# Patient Record
Sex: MALE | Race: WHITE | NOT HISPANIC OR LATINO | Employment: FULL TIME | URBAN - METROPOLITAN AREA
[De-identification: names, ages, dates, MRNs, and addresses within clinical notes are randomized per-mention and may not be internally consistent; named-entity substitution may affect disease eponyms.]

---

## 2021-07-07 ENCOUNTER — RECORDS - HEALTHEAST (OUTPATIENT)
Dept: LAB | Facility: CLINIC | Age: 21
End: 2021-07-07

## 2021-07-07 LAB
GLUCOSE-6-PHOSPHATE DEHYDROGENASE QUAL - HISTORICAL: NORMAL
SICKLE CELL PREP: NEGATIVE

## 2021-07-08 LAB
ABO/RH(D): NORMAL
ABORH REPEAT: NORMAL

## 2022-01-04 ENCOUNTER — OFFICE VISIT (OUTPATIENT)
Dept: INTERNAL MEDICINE | Facility: CLINIC | Age: 22
End: 2022-01-04
Payer: COMMERCIAL

## 2022-01-04 ENCOUNTER — LAB (OUTPATIENT)
Dept: LAB | Facility: CLINIC | Age: 22
End: 2022-01-04
Payer: COMMERCIAL

## 2022-01-04 VITALS
OXYGEN SATURATION: 100 % | HEART RATE: 71 BPM | SYSTOLIC BLOOD PRESSURE: 123 MMHG | HEIGHT: 74 IN | DIASTOLIC BLOOD PRESSURE: 82 MMHG

## 2022-01-04 DIAGNOSIS — Z00.00 HEALTHCARE MAINTENANCE: ICD-10-CM

## 2022-01-04 DIAGNOSIS — Z00.00 HEALTHCARE MAINTENANCE: Primary | ICD-10-CM

## 2022-01-04 DIAGNOSIS — F90.2 ATTENTION DEFICIT HYPERACTIVITY DISORDER (ADHD), COMBINED TYPE: ICD-10-CM

## 2022-01-04 LAB
CHOLEST SERPL-MCNC: 147 MG/DL
FASTING STATUS PATIENT QL REPORTED: YES
HBA1C MFR BLD: 5.2 % (ref 0–5.6)
HDLC SERPL-MCNC: 59 MG/DL
LDLC SERPL CALC-MCNC: 77 MG/DL
NONHDLC SERPL-MCNC: 88 MG/DL
TRIGL SERPL-MCNC: 54 MG/DL

## 2022-01-04 PROCEDURE — 80061 LIPID PANEL: CPT | Performed by: PATHOLOGY

## 2022-01-04 PROCEDURE — 99204 OFFICE O/P NEW MOD 45 MIN: CPT | Mod: GC

## 2022-01-04 PROCEDURE — 36415 COLL VENOUS BLD VENIPUNCTURE: CPT | Performed by: PATHOLOGY

## 2022-01-04 PROCEDURE — 83036 HEMOGLOBIN GLYCOSYLATED A1C: CPT | Performed by: PATHOLOGY

## 2022-01-04 RX ORDER — DEXTROAMPHETAMINE SACCHARATE, AMPHETAMINE ASPARTATE MONOHYDRATE, DEXTROAMPHETAMINE SULFATE AND AMPHETAMINE SULFATE 2.5; 2.5; 2.5; 2.5 MG/1; MG/1; MG/1; MG/1
10 CAPSULE, EXTENDED RELEASE ORAL DAILY
Qty: 30 CAPSULE | Refills: 0 | Status: SHIPPED | OUTPATIENT
Start: 2022-01-04 | End: 2022-02-10

## 2022-01-04 ASSESSMENT — PAIN SCALES - GENERAL: PAINLEVEL: NO PAIN (0)

## 2022-01-04 NOTE — PROGRESS NOTES
Internal Medicine Primary Care   SUBJECTIVE  CC: Establishing care    HPI: Luiz Almeida is a 21 year old male without a significant past medical history presenting to [facility].       PAST MEDICAL HISTORY  There is no problem list on file for this patient.        MEDICATIONS  No current outpatient medications on file.         PAST SURGICAL HISTORY  No past surgical history on file.      SOCIAL HISTORY    Tobacco, status; current consumption: ***    Alcohol: ***    Drugs: ***    Diet: ***    Exercise: ***    Occupation: ***    FAMILY HISTORY  No family history on file.      ALLERGIES      OBJECTIVE    Vitals      Last 6 BPs  BP Readings from Last 6 Encounters:   No data found for BP       Physical Exam    ASSESSMENT AND PLAN    RHM/Preventative care  - COVID 19 vaccination: ***  - Routine labs: ***  - Colonoscopy/FIT: ***   - Mammogram (F): ***  - Papsmears: ***  - AAA screening: ***  - Pneumovax: ***  - Shingrix: ***  - Intimate partner abuse: ***    ***      ***      ***    Patient understands and agrees with the above assessment and plan. Patient was staffed and seen with  ***    Follow up  RTC in *** for ***      Emiliano Molina,   PGY 1, Internal Medicine

## 2022-01-04 NOTE — PROGRESS NOTES
"I, Goran Trujillo MD saw the patient with the resident, and agree with the resident's findings and plan of care as documented in the resident's note.  /82 (BP Location: Right arm, Patient Position: Sitting, Cuff Size: Adult Regular)   Pulse 71   Ht 1.88 m (6' 2\")   SpO2 100%   I personally reviewed vital signs and past record.  Key findings: concerned about focusing when reading, working. Mind wandering, short-term memory issues, fidgets. Agree with plan for med, options.    "

## 2022-01-04 NOTE — NURSING NOTE
Chief Complaint   Patient presents with     Establish Care     Consult       SEYMOUR Soto at 12:07 PM on 1/4/2022.

## 2022-01-04 NOTE — PROGRESS NOTES
"                  Internal Medicine Primary Care   SUBJECTIVE  CC: Establishing care    HPI: Luiz Almeida is a 21 year old male without a significant past medical history here to establish care. Patient is a chemistry phD candidate. Today he wants to discuss symptoms regarding inattentiveness. Over the past few years he has noticed his mind has been wandering a lot more and that he is unable to pay attention to tasks for extended period of time. He gets sidetracked easily. He also endorses trouble with organizing tasks, short term memory deficits, has trouble finishing projects, endorses fidgeting behaviors, has to have music or TV playing to focus on things, has trouble with lengthy reading. He denies loss of appetite, sleep disturbances, depression, suicidality, anxiety, nervousness.      PAST MEDICAL HISTORY  There is no problem list on file for this patient.        MEDICATIONS  Current Outpatient Medications   Medication Sig Dispense Refill     amphetamine-dextroamphetamine (ADDERALL XR) 10 MG 24 hr capsule Take 1 capsule (10 mg) by mouth daily 30 capsule 0         PAST SURGICAL HISTORY  No past surgical history on file.      SOCIAL HISTORY    Tobacco, status; current consumption: Never smoker    Alcohol: 2-3 beers per week, usually with dinner, no concern for abuse    Drugs: Denies current or former use    Occupation: Chemistry phD candidate.    FAMILY HISTORY  No family history on file.      ALLERGIES   No Known Allergies      OBJECTIVE    Vitals  /82 (BP Location: Right arm, Patient Position: Sitting, Cuff Size: Adult Regular)   Pulse 71   Ht 1.88 m (6' 2\")   SpO2 100%     Last 6 BPs  BP Readings from Last 6 Encounters:   01/04/22 123/82       Physical Exam  Constitutional:       Appearance: Normal appearance. He is normal weight.   HENT:      Head: Normocephalic and atraumatic.   Eyes:      Extraocular Movements: Extraocular movements intact.   Cardiovascular:      Rate and Rhythm: Normal rate and " regular rhythm.      Pulses: Normal pulses.      Heart sounds: Normal heart sounds.   Pulmonary:      Effort: Pulmonary effort is normal.      Breath sounds: Normal breath sounds.   Abdominal:      General: Abdomen is flat. Bowel sounds are normal.      Palpations: Abdomen is soft.   Neurological:      Mental Status: He is alert.   Psychiatric:         Mood and Affect: Mood normal.         Behavior: Behavior normal.         Thought Content: Thought content normal.         Judgment: Judgment normal.         ASSESSMENT AND PLAN    RHM/Preventative care  - COVID 19 vaccination: Vaccinated, including booster  - Routine labs: Lipid and HbA1c  - Influenza: Vaccinated     ADHD, combined inattention and impulsivity type  Patient fulfills DSM-V criteria for ADHD diagnosis. Has > 6 symptoms related to impulsivity and inattention.  - Start 10 mg XR adderall, reassess in 1 month  - denies cardiac symptoms (CP, SOB, palpitations)      Patient understands and agrees with the above assessment and plan. Patient was staffed and seen with Dr. Trujillo    Follow up  RTC in 1 month for follow up on ADHD management      Emiliano Molina DO  PGY 1, Internal Medicine

## 2022-02-06 ENCOUNTER — HEALTH MAINTENANCE LETTER (OUTPATIENT)
Age: 22
End: 2022-02-06

## 2022-02-08 ENCOUNTER — OFFICE VISIT (OUTPATIENT)
Dept: INTERNAL MEDICINE | Facility: CLINIC | Age: 22
End: 2022-02-08
Payer: COMMERCIAL

## 2022-02-08 VITALS
OXYGEN SATURATION: 95 % | BODY MASS INDEX: 22.33 KG/M2 | HEIGHT: 74 IN | SYSTOLIC BLOOD PRESSURE: 103 MMHG | HEART RATE: 73 BPM | DIASTOLIC BLOOD PRESSURE: 69 MMHG | WEIGHT: 174 LBS

## 2022-02-08 DIAGNOSIS — F90.2 ATTENTION DEFICIT HYPERACTIVITY DISORDER (ADHD), COMBINED TYPE: ICD-10-CM

## 2022-02-08 PROCEDURE — 99214 OFFICE O/P EST MOD 30 MIN: CPT | Mod: GC

## 2022-02-08 RX ORDER — DEXTROAMPHETAMINE SACCHARATE, AMPHETAMINE ASPARTATE MONOHYDRATE, DEXTROAMPHETAMINE SULFATE AND AMPHETAMINE SULFATE 2.5; 2.5; 2.5; 2.5 MG/1; MG/1; MG/1; MG/1
10 CAPSULE, EXTENDED RELEASE ORAL DAILY
Qty: 30 CAPSULE | Refills: 11 | Status: CANCELLED | OUTPATIENT
Start: 2022-02-08

## 2022-02-08 ASSESSMENT — PAIN SCALES - GENERAL: PAINLEVEL: NO PAIN (0)

## 2022-02-08 ASSESSMENT — MIFFLIN-ST. JEOR: SCORE: 1854.01

## 2022-02-08 NOTE — PROGRESS NOTES
"                  Internal Medicine Primary Care   SUBJECTIVE  CC: Follow up; med management    HPI: Luiz Almeida is a 21 year old male without a significant past medical history here for follow up on ADHD and adderall prescription. He states that adderall has helped him a lot with attention and concentration in his studies and research. He is not noticing any side effects. He denies CP, SOB, palpitations, loss of appetite or changes in sleep patterns. He is agreeable to do a urine drug screen today and ok to sign a drug contract      PAST MEDICAL HISTORY  There is no problem list on file for this patient.        MEDICATIONS  Current Outpatient Medications   Medication Sig Dispense Refill     amphetamine-dextroamphetamine (ADDERALL XR) 10 MG 24 hr capsule Take 1 capsule (10 mg) by mouth daily 30 capsule 0         PAST SURGICAL HISTORY  No past surgical history on file.      SOCIAL HISTORY    Tobacco, status; current consumption: Never smoker    Alcohol: 2-3 beers per week, usually with dinner, no concern for abuse    Drugs: Denies current or former use    Occupation: Chemistry phD candidate.    FAMILY HISTORY  No family history on file.      ALLERGIES   No Known Allergies      OBJECTIVE    Vitals  /69 (BP Location: Right arm, Patient Position: Sitting, Cuff Size: Adult Regular)   Pulse 73   Ht 1.88 m (6' 2\")   Wt 78.9 kg (174 lb)   SpO2 95%   BMI 22.34 kg/m      Last 6 BPs  BP Readings from Last 6 Encounters:   02/08/22 103/69   01/04/22 123/82       Physical Exam  Constitutional:       Appearance: Normal appearance. He is normal weight.   HENT:      Head: Normocephalic and atraumatic.   Eyes:      Extraocular Movements: Extraocular movements intact.   Cardiovascular:      Rate and Rhythm: Normal rate and regular rhythm.      Pulses: Normal pulses.      Heart sounds: Normal heart sounds.   Pulmonary:      Effort: Pulmonary effort is normal.      Breath sounds: Normal breath sounds.   Abdominal:      " General: Abdomen is flat. Bowel sounds are normal.      Palpations: Abdomen is soft.   Neurological:      Mental Status: He is alert.   Psychiatric:         Mood and Affect: Mood normal.         Behavior: Behavior normal.         Thought Content: Thought content normal.         Judgment: Judgment normal.         ASSESSMENT AND PLAN    RHM/Preventative care  - COVID 19 vaccination: Vaccinated, including booster  - Routine labs: Lipid and HbA1c at last visit (normal)  - Influenza: Vaccinated     ADHD, combined inattention and impulsivity type  Patient fulfills DSM-V criteria for ADHD diagnosis. Has > 6 symptoms related to impulsivity and inattention.  - Doing well on 10 mg XR aderrall  - UDS today, patient also signed drug contract  - denies adverse effects (CP, SOB, palpitations, loss of appetite, changes in sleep pattern)  - BP, HR and weight at baseline      Patient understands and agrees with the above assessment and plan. Patient was staffed and seen with Dr. Alston    Follow up  RTC in 1 year for routine physical      Emiliano Molina DO  Internal Medicine, PGY 1  [facility]

## 2022-02-08 NOTE — NURSING NOTE
Chief Complaint   Patient presents with     Recheck Medication     5 week follow up       Miguelina Chamberlain EMT at 1:00 PM on 2/8/2022

## 2022-02-08 NOTE — LETTER
[facility]  02/08/22  Patient: Luiz Almeida  YOB: 2000  Medical Record Number: [id number]                                                                                  Non-Opioid Controlled Substance Agreement    This is an agreement between you and your provider regarding safe and appropriate use of controlled substances prescribed by your care team. Controlled substances are?medicines that can cause physical and mental dependence (abuse).     There are strict laws about having and using these medicines. We here at [facility] are  committed to working with you in your efforts to get better. To support you in this work, we'll help you schedule regular office appointments for medicine refills. If we must cancel or change your appointment for any reason, we'll make sure you have enough medicine to last until your next appointment.     As a Provider, I will:     Listen carefully to your concerns while treating you with respect.     Recommend a treatment plan that I believe is in your best interest and may involve therapies other than medicine.      Talk with you often about the possible benefits and the risk of harm of any medicine that we prescribe for you.    Assess the safety of this medicine and check how well it works.      Provide a plan on how to taper (discontinue or go off) using this medicine if the decision is made to stop its use.      ::  As a Patient, I understand controlled substances:       Are prescribed by my care provider to help me function or work and manage my condition(s).?    Are strong medicines and can cause serious side effects.       Need to be taken exactly as prescribed.?Combining controlled substances with certain medicines or chemicals (such as illegal drugs, alcohol, sedatives, sleeping pills, and benzodiazepines) can be dangerous or even fatal.? If I stop taking my medicines suddenly, I may have severe withdrawal  symptoms.     The risks, benefits, and side effects of these medicine(s) were explained to me. I agree that:    1. I will take part in other treatments as advised by my care team. This may be psychiatry or counseling, physical therapy, behavioral therapy, group treatment or a referral to specialist.    2. I will keep all my appointments and understand this is part of the monitoring of controlled substances.?My care team may require an office visit for EVERY controlled substance refill. If I miss appointments or don t follow instructions, my care team may stop my medicine    3. I will take my medicines as prescribed. I will not change the dose or schedule unless my care team tells me to. There will be no refills if I run out early.      4. I may be asked to come to the clinic and complete a urine drug test or complete a pill count. If I don t give a urine sample or participate in a pill count, the care team may stop my medicine.    5. I will only receive controlled substance prescriptions from this clinic. If I am treated by another provider, I will tell them that I am taking controlled substances and that I have a treatment agreement with this provider. I will inform my [facility] care team within one business day if I am given a prescription for any controlled substance by another healthcare provider. My [facility] care team can contact other providers and pharmacists about my use of any medicines.    6. It is up to me to make sure that I don't run out of my medicines on weekends or holidays.?If my care team is willing to refill my prescription without a visit, I must request refills only during office hours. Refills may take up to 5-7 business days to process. I will use one pharmacy to fill all my controlled substance prescriptions. I will notify the clinic about any changes to my insurance or medicine availability.    7. I am responsible for my prescriptions. If the medicine/prescription is lost,  stolen or destroyed, it will not be replaced.?I also agree not to share controlled substance medicines with anyone.     8. I am aware I should not use any illegal or recreational drugs. I agree not to drink alcohol unless my care team says I can.     9. If I enroll in the [place] Medical Cannabis program, I will tell my care team before my next refill.    10. I will tell my care team right away if I become pregnant, have a new medical problem treated outside of my regular clinic, or have a change in my medicines.     11. I understand that this medicine can affect my thinking, judgment and reaction time.? Alcohol and drugs affect the brain and body, which can affect the safety of my driving. Being under the influence of alcohol or drugs can affect my decision-making, behaviors, personal safety and the safety of others. Driving while impaired (DWI) can occur if a person is driving, operating or in physical control of a car, motorcycle, boat, snowmobile, ATV, motorbike, off-road vehicle or any other motor vehicle ([place] Statute 169A.20). I understand the risk if I choose to drive or operate any vehicle or machinery.    I understand that if I do not follow any of the conditions above, my prescriptions or treatment may be stopped or changed.   I agree that my provider, clinic care team and pharmacy may work with any city, state or federal law enforcement agency that investigates the misuse, sale or other diversion of my controlled medicine. I will allow my provider to discuss my care with, or share a copy of, this agreement with any other treating provider, pharmacy or emergency room where I receive care.     I have read this agreement and have asked questions about anything I did not understand.    ________________________________________________________  Patient Signature - Luiz Almeida     ___________________                   Date     ________________________________________________________  Provider Signature -  Emiliano Molina, MD       ___________________                   Date     ________________________________________________________  Witness Signature (required if provider not present while patient signing)          ___________________                   Date

## 2022-02-10 DIAGNOSIS — F90.2 ATTENTION DEFICIT HYPERACTIVITY DISORDER (ADHD), COMBINED TYPE: ICD-10-CM

## 2022-02-10 RX ORDER — DEXTROAMPHETAMINE SACCHARATE, AMPHETAMINE ASPARTATE MONOHYDRATE, DEXTROAMPHETAMINE SULFATE AND AMPHETAMINE SULFATE 2.5; 2.5; 2.5; 2.5 MG/1; MG/1; MG/1; MG/1
10 CAPSULE, EXTENDED RELEASE ORAL DAILY
Qty: 90 CAPSULE | Refills: 0 | Status: SHIPPED | OUTPATIENT
Start: 2022-02-10 | End: 2022-06-14

## 2022-02-10 NOTE — TELEPHONE ENCOUNTER
[facility] Call Center    Phone Message    May a detailed message be left on voicemail: yes     Reason for Call: Medication Refill Request    Has the patient contacted the pharmacy for the refill? Yes   Name of medication being requested: amphetamine-dextroamphetamine (ADDERALL XR) 10 MG 24 hr capsule  Provider who prescribed the medication: Tracy   Pharmacy: [place], [place] - [address] [place] [address] [phone]  Date medication is needed: As soon as possible         Action Taken: Message routed to:  [facility]    Travel Screening: Not Applicable

## 2022-05-31 ENCOUNTER — MYC REFILL (OUTPATIENT)
Dept: INTERNAL MEDICINE | Facility: CLINIC | Age: 22
End: 2022-05-31
Payer: COMMERCIAL

## 2022-05-31 DIAGNOSIS — F90.2 ATTENTION DEFICIT HYPERACTIVITY DISORDER (ADHD), COMBINED TYPE: ICD-10-CM

## 2022-05-31 RX ORDER — DEXTROAMPHETAMINE SACCHARATE, AMPHETAMINE ASPARTATE MONOHYDRATE, DEXTROAMPHETAMINE SULFATE AND AMPHETAMINE SULFATE 2.5; 2.5; 2.5; 2.5 MG/1; MG/1; MG/1; MG/1
10 CAPSULE, EXTENDED RELEASE ORAL DAILY
Qty: 90 CAPSULE | Refills: 0 | Status: CANCELLED | OUTPATIENT
Start: 2022-05-31

## 2022-06-08 ENCOUNTER — MYC REFILL (OUTPATIENT)
Dept: INTERNAL MEDICINE | Facility: CLINIC | Age: 22
End: 2022-06-08
Payer: COMMERCIAL

## 2022-06-08 DIAGNOSIS — F90.2 ATTENTION DEFICIT HYPERACTIVITY DISORDER (ADHD), COMBINED TYPE: ICD-10-CM

## 2022-06-08 RX ORDER — DEXTROAMPHETAMINE SACCHARATE, AMPHETAMINE ASPARTATE MONOHYDRATE, DEXTROAMPHETAMINE SULFATE AND AMPHETAMINE SULFATE 2.5; 2.5; 2.5; 2.5 MG/1; MG/1; MG/1; MG/1
10 CAPSULE, EXTENDED RELEASE ORAL DAILY
Qty: 90 CAPSULE | Refills: 0 | Status: CANCELLED | OUTPATIENT
Start: 2022-06-08

## 2022-06-11 DIAGNOSIS — F90.2 ATTENTION DEFICIT HYPERACTIVITY DISORDER (ADHD), COMBINED TYPE: ICD-10-CM

## 2022-06-14 RX ORDER — DEXTROAMPHETAMINE/AMPHETAMINE 10 MG
CAPSULE, EXT RELEASE 24 HR ORAL
Qty: 90 CAPSULE | Refills: 0 | Status: SHIPPED | OUTPATIENT
Start: 2022-06-14 | End: 2022-09-05

## 2022-06-14 NOTE — TELEPHONE ENCOUNTER
[facility] Call Center    Phone Message    May a detailed message be left on voicemail: yes     Reason for Call: Medication Question or concern regarding medication   Prescription Clarification  Name of Medication: amphetamine-dextroamphetamine (ADDERALL XR) 10 MG 24 hr capsule   Prescribing Provider: Emiliano Molina MD  And  Germaine Alston MD   Pharmacy: [place], [place] - [address] [place] [address] [phone]   What on the order needs clarification?    The patient was calling to check the status of the refill request, he s sent numerous request because he s been out for 2 weeks now, please review and follow up with the patient asap thank you.     He will be traveling out of town as of next week FYI      Action Taken: Message routed to:  [facility]    Travel Screening: Not Applicable

## 2022-06-14 NOTE — TELEPHONE ENCOUNTER
RN called patient and let him know Adderall Rx was sent to the [facility].    Anne Wiggins RN on 6/14/2022 at 2:00 PM

## 2022-06-15 NOTE — TELEPHONE ENCOUNTER
The original prescription was reordered on 6/14/2022 by Goran Trujillo MD. Renewing this prescription may not be appropriate.

## 2022-06-15 NOTE — TELEPHONE ENCOUNTER
amphetamine-dextroamphetamine (ADDERALL XR) 10 MG 24 hr capsule          The original prescription was reordered on 6/14/2022 by Goran Trujillo MD. Renewing this prescription may not be appropriate.

## 2022-09-05 ENCOUNTER — MYC REFILL (OUTPATIENT)
Dept: INTERNAL MEDICINE | Facility: CLINIC | Age: 22
End: 2022-09-05

## 2022-09-05 DIAGNOSIS — F90.2 ATTENTION DEFICIT HYPERACTIVITY DISORDER (ADHD), COMBINED TYPE: ICD-10-CM

## 2022-09-06 NOTE — TELEPHONE ENCOUNTER
ADDERALL XR 10 MG 24 hr capsule 90 capsule 0 6/14/2022         Last Written Prescription Date:  6-  Last Fill Quantity: 90,   # refills: 0  Last Office Visit : 2-8-2022  Future Office visit:  NONE    Routing refill request to provider for review/approval because:  CONTROLLED MEDICATION      Kathleen M Doege RN

## 2022-09-07 RX ORDER — DEXTROAMPHETAMINE SACCHARATE, AMPHETAMINE ASPARTATE MONOHYDRATE, DEXTROAMPHETAMINE SULFATE AND AMPHETAMINE SULFATE 2.5; 2.5; 2.5; 2.5 MG/1; MG/1; MG/1; MG/1
10 CAPSULE, EXTENDED RELEASE ORAL DAILY
Qty: 90 CAPSULE | Refills: 0 | Status: SHIPPED | OUTPATIENT
Start: 2022-09-13 | End: 2022-12-18

## 2022-10-03 ENCOUNTER — HEALTH MAINTENANCE LETTER (OUTPATIENT)
Age: 22
End: 2022-10-03

## 2022-12-18 ENCOUNTER — MYC REFILL (OUTPATIENT)
Dept: INTERNAL MEDICINE | Facility: CLINIC | Age: 22
End: 2022-12-18

## 2022-12-18 DIAGNOSIS — F90.2 ATTENTION DEFICIT HYPERACTIVITY DISORDER (ADHD), COMBINED TYPE: ICD-10-CM

## 2022-12-20 RX ORDER — DEXTROAMPHETAMINE SACCHARATE, AMPHETAMINE ASPARTATE MONOHYDRATE, DEXTROAMPHETAMINE SULFATE AND AMPHETAMINE SULFATE 2.5; 2.5; 2.5; 2.5 MG/1; MG/1; MG/1; MG/1
10 CAPSULE, EXTENDED RELEASE ORAL DAILY
Qty: 90 CAPSULE | Refills: 0 | Status: SHIPPED | OUTPATIENT
Start: 2022-12-20 | End: 2023-01-26

## 2022-12-20 NOTE — CONFIDENTIAL NOTE
Last refill was Sept.      Apparently this patient's insurance allows for 90-day refills, unlike some other insurances

## 2023-01-26 ENCOUNTER — MYC REFILL (OUTPATIENT)
Dept: INTERNAL MEDICINE | Facility: CLINIC | Age: 23
End: 2023-01-26
Payer: COMMERCIAL

## 2023-01-26 DIAGNOSIS — F90.2 ATTENTION DEFICIT HYPERACTIVITY DISORDER (ADHD), COMBINED TYPE: ICD-10-CM

## 2023-01-26 RX ORDER — DEXTROAMPHETAMINE SACCHARATE, AMPHETAMINE ASPARTATE MONOHYDRATE, DEXTROAMPHETAMINE SULFATE AND AMPHETAMINE SULFATE 2.5; 2.5; 2.5; 2.5 MG/1; MG/1; MG/1; MG/1
10 CAPSULE, EXTENDED RELEASE ORAL DAILY
Qty: 90 CAPSULE | Refills: 0 | Status: SHIPPED | OUTPATIENT
Start: 2023-01-26 | End: 2023-04-27

## 2023-01-26 NOTE — CONFIDENTIAL NOTE
Please call patient:  -- update about the 90 versus 30 day issue (not clear why he only got a 30-day supply in December). I wrote for 90 days again.  -- advise (as I probably already did) that I need a visit every 6 months to continue ADD meds. Video ok, ARNOLDO not ok. Please ask him to call now for video appointment, so he isn't stuck without refills in 90 days.

## 2023-02-11 ENCOUNTER — HEALTH MAINTENANCE LETTER (OUTPATIENT)
Age: 23
End: 2023-02-11

## 2023-04-12 ENCOUNTER — MEDICAL CORRESPONDENCE (OUTPATIENT)
Dept: HEALTH INFORMATION MANAGEMENT | Facility: CLINIC | Age: 23
End: 2023-04-12
Payer: COMMERCIAL

## 2023-04-12 ENCOUNTER — TRANSFERRED RECORDS (OUTPATIENT)
Dept: HEALTH INFORMATION MANAGEMENT | Facility: CLINIC | Age: 23
End: 2023-04-12
Payer: COMMERCIAL

## 2023-04-17 ENCOUNTER — TRANSCRIBE ORDERS (OUTPATIENT)
Dept: OTHER | Age: 23
End: 2023-04-17

## 2023-04-17 DIAGNOSIS — L72.3 SEBACEOUS CYST: Primary | ICD-10-CM

## 2023-06-04 ENCOUNTER — MYC REFILL (OUTPATIENT)
Dept: INTERNAL MEDICINE | Facility: CLINIC | Age: 23
End: 2023-06-04

## 2023-06-04 DIAGNOSIS — F90.2 ATTENTION DEFICIT HYPERACTIVITY DISORDER (ADHD), COMBINED TYPE: ICD-10-CM

## 2023-08-08 ENCOUNTER — MYC REFILL (OUTPATIENT)
Dept: INTERNAL MEDICINE | Facility: CLINIC | Age: 23
End: 2023-08-08
Payer: COMMERCIAL

## 2023-08-08 DIAGNOSIS — F90.2 ATTENTION DEFICIT HYPERACTIVITY DISORDER (ADHD), COMBINED TYPE: ICD-10-CM

## 2023-08-09 RX ORDER — DEXTROAMPHETAMINE SACCHARATE, AMPHETAMINE ASPARTATE MONOHYDRATE, DEXTROAMPHETAMINE SULFATE AND AMPHETAMINE SULFATE 2.5; 2.5; 2.5; 2.5 MG/1; MG/1; MG/1; MG/1
10 CAPSULE, EXTENDED RELEASE ORAL DAILY
Qty: 90 CAPSULE | Refills: 0 | Status: SHIPPED | OUTPATIENT
Start: 2023-08-09 | End: 2023-11-07

## 2023-08-10 ENCOUNTER — TELEPHONE (OUTPATIENT)
Dept: DERMATOLOGY | Facility: CLINIC | Age: 23
End: 2023-08-10
Payer: COMMERCIAL

## 2023-11-07 ENCOUNTER — MYC REFILL (OUTPATIENT)
Dept: INTERNAL MEDICINE | Facility: CLINIC | Age: 23
End: 2023-11-07
Payer: COMMERCIAL

## 2023-11-07 DIAGNOSIS — F90.2 ATTENTION DEFICIT HYPERACTIVITY DISORDER (ADHD), COMBINED TYPE: ICD-10-CM

## 2023-11-07 RX ORDER — DEXTROAMPHETAMINE SACCHARATE, AMPHETAMINE ASPARTATE MONOHYDRATE, DEXTROAMPHETAMINE SULFATE AND AMPHETAMINE SULFATE 2.5; 2.5; 2.5; 2.5 MG/1; MG/1; MG/1; MG/1
10 CAPSULE, EXTENDED RELEASE ORAL DAILY
Qty: 90 CAPSULE | Refills: 0 | Status: SHIPPED | OUTPATIENT
Start: 2023-11-07 | End: 2024-02-06

## 2024-02-06 ENCOUNTER — MYC REFILL (OUTPATIENT)
Dept: INTERNAL MEDICINE | Facility: CLINIC | Age: 24
End: 2024-02-06
Payer: COMMERCIAL

## 2024-02-06 DIAGNOSIS — F90.2 ATTENTION DEFICIT HYPERACTIVITY DISORDER (ADHD), COMBINED TYPE: ICD-10-CM

## 2024-02-10 RX ORDER — DEXTROAMPHETAMINE SACCHARATE, AMPHETAMINE ASPARTATE MONOHYDRATE, DEXTROAMPHETAMINE SULFATE AND AMPHETAMINE SULFATE 2.5; 2.5; 2.5; 2.5 MG/1; MG/1; MG/1; MG/1
10 CAPSULE, EXTENDED RELEASE ORAL DAILY
Qty: 90 CAPSULE | Refills: 0 | Status: SHIPPED | OUTPATIENT
Start: 2024-02-10 | End: 2024-05-08

## 2024-03-09 ENCOUNTER — HEALTH MAINTENANCE LETTER (OUTPATIENT)
Age: 24
End: 2024-03-09

## 2024-05-08 ENCOUNTER — MYC REFILL (OUTPATIENT)
Dept: INTERNAL MEDICINE | Facility: CLINIC | Age: 24
End: 2024-05-08
Payer: COMMERCIAL

## 2024-05-08 DIAGNOSIS — F90.2 ATTENTION DEFICIT HYPERACTIVITY DISORDER (ADHD), COMBINED TYPE: ICD-10-CM

## 2024-05-09 RX ORDER — DEXTROAMPHETAMINE SACCHARATE, AMPHETAMINE ASPARTATE MONOHYDRATE, DEXTROAMPHETAMINE SULFATE AND AMPHETAMINE SULFATE 2.5; 2.5; 2.5; 2.5 MG/1; MG/1; MG/1; MG/1
10 CAPSULE, EXTENDED RELEASE ORAL DAILY
Qty: 90 CAPSULE | Refills: 0 | Status: SHIPPED | OUTPATIENT
Start: 2024-05-09 | End: 2024-08-08

## 2024-05-09 NOTE — TELEPHONE ENCOUNTER
Patient confirmed scheduled appointment:  Date: 5/21  Time: 9a  Visit type: rtn  Provider: pcp  Location: [facility]

## 2024-05-20 ENCOUNTER — OFFICE VISIT (OUTPATIENT)
Dept: DERMATOLOGY | Facility: CLINIC | Age: 24
End: 2024-05-20
Payer: COMMERCIAL

## 2024-05-20 DIAGNOSIS — L72.0 EIC (EPIDERMAL INCLUSION CYST): Primary | ICD-10-CM

## 2024-05-20 PROCEDURE — 99202 OFFICE O/P NEW SF 15 MIN: CPT | Mod: GC | Performed by: DERMATOLOGY

## 2024-05-20 NOTE — PROGRESS NOTES
[facility] Dermatology Note  Encounter Date: May 20, 2024  Office Visit     Dermatology Problem List:  1. Favor recurrent EIC, R cheek  - Pt reports excised in 2018 and pathology consistent with benign sebaceous cyst  - Pending derm surgery referral  ____________________________________________    Assessment & Plan:     1. Favor recurrent EIC, R cheek  - Pt reports excised in 2018 and pathology consistent with benign sebaceous cyst  - Has regrown and patient would like to have it excised  - Derm surgery referral today      Procedures Performed:   None    Follow-up: prn for new or changing lesions    Staff and Resident:    Dasha Stubbs MD (PGY-3)  Dermatology Resident     Staff Physician Comments:   I saw and evaluated the patient with the resident and I edited the assessment and plan as documented in the note. I was present for the examination.    Miguel Vásquez MD   of Dermatology  Department of Dermatology  [facility]        ____________________________________________    CC: Derm Problem (Cyst in the right cheek. Has been there since 2018 when it was removed, doesn't think it was removed all the way, been coming back for about a year and a half. )    HPI:  Mr. Luiz Almeida is a(n) 24 year old male who presents today as a new patient for cyst on his right cheek. Pt reports excised in 2018 and pathology consistent with benign sebaceous cyst. He would like to have it excised. Not painful or itching.     Patient is otherwise feeling well, without additional skin concerns.    Labs Reviewed:  N/A    Physical Exam:  Vitals: There were no vitals taken for this visit.    SKIN: Focused exam of the R cheek-    Firm subcutaneous nodule on the R cheek, approximately 1.5 cm          Medications:  Current Outpatient Medications   Medication Sig Dispense Refill    amphetamine-dextroamphetamine (ADDERALL XR) 10 MG 24 hr capsule Take 1 capsule (10 mg)  by mouth daily (covers through 4/26/23) 90 capsule 0     No current facility-administered medications for this visit.      Past Medical History:   There is no problem list on file for this patient.    No past medical history on file.    CC Remington Pearl PA-C  [address] [address] on close of this encounter.

## 2024-05-20 NOTE — NURSING NOTE
Luiz Almeida's goals for this visit include:   Chief Complaint   Patient presents with    Derm Problem     Cyst in the right cheek. Has been there since 2018 when it was removed, doesn't think it was removed all the way, been coming back for about a year and a half.        He requests these members of his care team be copied on today's visit information:     PCP: Emiliano Molina    Referring Provider:  [place] [address]    There were no vitals taken for this visit.    Do you need any medication refills at today's visit?     Shiloh Mosher LPN on 5/20/2024 at 10:56 AM

## 2024-05-20 NOTE — LETTER
5/20/2024         RE: Luiz Almeida  [address]  [place] [address]        Dear Colleague,    Thank you for referring your patient, Luiz Almeida, to the [facility]. Please see a copy of my visit note below.    [facility] Dermatology Note  Encounter Date: May 20, 2024  Office Visit     Dermatology Problem List:  1. Favor recurrent EIC, R cheek  - Pt reports excised in 2018 and pathology consistent with benign sebaceous cyst  - Pending derm surgery referral  ____________________________________________    Assessment & Plan:     1. Favor recurrent EIC, R cheek  - Pt reports excised in 2018 and pathology consistent with benign sebaceous cyst  - Has regrown and patient would like to have it excised  - Derm surgery referral today      Procedures Performed:   None    Follow-up: prn for new or changing lesions    Staff and Resident:    Dasha Stubbs MD (PGY-3)  Dermatology Resident     Staff Physician Comments:   I saw and evaluated the patient with the resident and I edited the assessment and plan as documented in the note. I was present for the examination.    Miguel Vásquez MD   of Dermatology  Department of Dermatology  [facility]        ____________________________________________    CC: Derm Problem (Cyst in the right cheek. Has been there since 2018 when it was removed, doesn't think it was removed all the way, been coming back for about a year and a half. )    HPI:  Mr. Luiz Almeida is a(n) 24 year old male who presents today as a new patient for cyst on his right cheek. Pt reports excised in 2018 and pathology consistent with benign sebaceous cyst. He would like to have it excised. Not painful or itching.     Patient is otherwise feeling well, without additional skin concerns.    Labs Reviewed:  N/A    Physical Exam:  Vitals: There were no vitals taken for this visit.    SKIN: Focused exam  of the R cheek-    Firm subcutaneous nodule on the R cheek, approximately 1.5 cm          Medications:  Current Outpatient Medications   Medication Sig Dispense Refill     amphetamine-dextroamphetamine (ADDERALL XR) 10 MG 24 hr capsule Take 1 capsule (10 mg) by mouth daily (covers through 4/26/23) 90 capsule 0     No current facility-administered medications for this visit.      Past Medical History:   There is no problem list on file for this patient.    No past medical history on file.    CC [place] [address] on close of this encounter.       Again, thank you for allowing me to participate in the care of your patient.        Sincerely,        Miguel Vásquez MD

## 2024-05-21 ENCOUNTER — OFFICE VISIT (OUTPATIENT)
Dept: INTERNAL MEDICINE | Facility: CLINIC | Age: 24
End: 2024-05-21
Payer: COMMERCIAL

## 2024-05-21 VITALS
HEART RATE: 70 BPM | TEMPERATURE: 97.7 F | WEIGHT: 178.9 LBS | BODY MASS INDEX: 22.96 KG/M2 | SYSTOLIC BLOOD PRESSURE: 127 MMHG | HEIGHT: 74 IN | OXYGEN SATURATION: 100 % | DIASTOLIC BLOOD PRESSURE: 80 MMHG

## 2024-05-21 DIAGNOSIS — Z00.00 HEALTHCARE MAINTENANCE: Primary | ICD-10-CM

## 2024-05-21 PROCEDURE — 99213 OFFICE O/P EST LOW 20 MIN: CPT | Mod: GC

## 2024-05-21 NOTE — PROGRESS NOTES
Answers submitted by the patient for this visit:  General Questionnaire (Submitted on 5/21/2024)  Chief Complaint: Chronic problems general questions HPI Form  What is the reason for your visit today? : Adhd checkup  How many servings of fruits and vegetables do you eat daily?: 2-3  On average, how many sweetened beverages do you drink each day (Examples: soda, juice, sweet tea, etc.  Do NOT count diet or artificially sweetened beverages)?: 0  How many minutes a day do you exercise enough to make your heart beat faster?: 30 to 60  How many days a week do you exercise enough to make your heart beat faster?: 4  How many days per week do you miss taking your medication?: 0

## 2024-05-21 NOTE — PROGRESS NOTES
"  Assessment & Plan   Annual visit  HCM  - deferred covid booster  - Patient will send Hep C screening results via my chart      ADHD  - Continue aderall, discussed side effects   - Reviewed weight chart  - No other high risk behaviors    No follow-ups on file.    Umer Dee is a 24 year old, presenting for the following health issues:  Recheck Medication (Pt here for med check for adhd)      5/21/2024     8:46 AM   Additional Questions   Roomed by [facility]     24 year old healthy patient presents for annual visit. No concerns or complaints.Does not use tobacco, drinks 2-3 beers per week. No other drugs    History of Present Illness       Reason for visit:  Adhd checkup    He eats 2-3 servings of fruits and vegetables daily.He consumes 0 sweetened beverage(s) daily.He exercises with enough effort to increase his heart rate 30 to 60 minutes per day.  He exercises with enough effort to increase his heart rate 4 days per week.   He is taking medications regularly.       Objective    /81 (BP Location: Right arm, Patient Position: Sitting, Cuff Size: Adult Regular)   Pulse 70   Temp 97.7  F (36.5  C) (Oral)   Ht 1.88 m (6' 2\")   Wt 81.1 kg (178 lb 14.4 oz)   SpO2 100%   BMI 22.97 kg/m    Body mass index is 22.97 kg/m .  Physical Exam  Constitutional:       Appearance: Normal appearance.   HENT:      Head: Atraumatic.   Eyes:      Extraocular Movements: Extraocular movements intact.   Cardiovascular:      Rate and Rhythm: Normal rate and regular rhythm.   Pulmonary:      Breath sounds: Normal breath sounds.   Neurological:      Mental Status: He is alert and oriented to person, place, and time.            Signed Electronically by: Emiliano Molina MD    "

## 2024-05-23 ENCOUNTER — TELEPHONE (OUTPATIENT)
Dept: DERMATOLOGY | Facility: CLINIC | Age: 24
End: 2024-05-23
Payer: COMMERCIAL

## 2024-05-23 NOTE — TELEPHONE ENCOUNTER
Called patient to schedule surgery with Dr. Langley    Date of Surgery: 08/08    Surgery type: excision     Consult scheduled: Not Applicable    Has patient had mohs with us before? Not Applicable    Additional comments: pat Alvarado on 5/23/2024 at 8:58 AM

## 2024-06-12 RX ORDER — DEXTROAMPHETAMINE SACCHARATE, AMPHETAMINE ASPARTATE MONOHYDRATE, DEXTROAMPHETAMINE SULFATE AND AMPHETAMINE SULFATE 2.5; 2.5; 2.5; 2.5 MG/1; MG/1; MG/1; MG/1
10 CAPSULE, EXTENDED RELEASE ORAL DAILY
Qty: 90 CAPSULE | Refills: 0 | OUTPATIENT
Start: 2024-06-12

## 2024-08-02 ENCOUNTER — TELEPHONE (OUTPATIENT)
Dept: DERMATOLOGY | Facility: CLINIC | Age: 24
End: 2024-08-02
Payer: COMMERCIAL

## 2024-08-02 NOTE — TELEPHONE ENCOUNTER
Excision/Mohs previsit information                                                    Diagnosis: EIC  Site(s): right cheek    Is the surgical site below the waist?  NO  If yes, instruct the patient to purchase over the counter chlorhexidine surgical soap and wash all skin below the belly button twice before surgery    No Known Allergies    Review and update allergy and medication list    Do you take the following medications:  Coumadin, Eliquis, Pradaxa, Xarelto:  NO.  If on Coumadin, INR should be checked within 7 days of surgery.  Range should be 3.5 or less or within therapeutic range.    Do you currently or have you previously had any of the following conditions:  Hepatitis:  NO  HIV/AIDS:  NO  Prolonged bleeding or bleeding disorder:  NO  Pacemaker: NO  Defibrillator:  NO  History of artificial or heart valve replacement:  NO  Endocarditis (inflammation of the inner lining of the heart's chambers and valves):  NO  Have you ever had a prosthetic joint infection:  NO  Pregnant or Breastfeeding:  N/A  Mobility device (wheelchair, transfer difficulty): NO    Important Reminders:                                                      -Ok to take all of their medications as prescribed  -Patients can eat, no need to be fasting  -If face is being treated, please come with a make-up free face  -If scalp is being treated, please come with clean hair free from product  -Patient will not be able to get the site wet for 48 hrs  -No submerging wound in standing water (lake, pool, bathtub, hot tub) for 2 weeks  -No physical activity for 48 hrs (further restrictions will be discussed by MD at time of visit)    Stephanie Fragoso RN

## 2024-08-08 ENCOUNTER — TELEPHONE (OUTPATIENT)
Dept: INTERNAL MEDICINE | Facility: CLINIC | Age: 24
End: 2024-08-08

## 2024-08-08 ENCOUNTER — MYC REFILL (OUTPATIENT)
Dept: INTERNAL MEDICINE | Facility: CLINIC | Age: 24
End: 2024-08-08

## 2024-08-08 ENCOUNTER — OFFICE VISIT (OUTPATIENT)
Dept: DERMATOLOGY | Facility: CLINIC | Age: 24
End: 2024-08-08
Payer: COMMERCIAL

## 2024-08-08 VITALS — DIASTOLIC BLOOD PRESSURE: 85 MMHG | HEART RATE: 81 BPM | OXYGEN SATURATION: 99 % | SYSTOLIC BLOOD PRESSURE: 129 MMHG

## 2024-08-08 DIAGNOSIS — D11.0 PLEOMORPHIC ADENOMA OF PAROTID GLAND: Primary | ICD-10-CM

## 2024-08-08 DIAGNOSIS — F90.9 ATTENTION DEFICIT HYPERACTIVITY DISORDER (ADHD), UNSPECIFIED ADHD TYPE: Primary | ICD-10-CM

## 2024-08-08 DIAGNOSIS — L72.0 EPIDERMAL INCLUSION CYST: ICD-10-CM

## 2024-08-08 DIAGNOSIS — L72.0 EIC (EPIDERMAL INCLUSION CYST): ICD-10-CM

## 2024-08-08 DIAGNOSIS — F90.2 ATTENTION DEFICIT HYPERACTIVITY DISORDER (ADHD), COMBINED TYPE: ICD-10-CM

## 2024-08-08 PROCEDURE — 88342 IMHCHEM/IMCYTCHM 1ST ANTB: CPT | Mod: XS | Performed by: STUDENT IN AN ORGANIZED HEALTH CARE EDUCATION/TRAINING PROGRAM

## 2024-08-08 PROCEDURE — 12032 INTMD RPR S/A/T/EXT 2.6-7.5: CPT | Mod: GC | Performed by: DERMATOLOGY

## 2024-08-08 PROCEDURE — 88360 TUMOR IMMUNOHISTOCHEM/MANUAL: CPT | Performed by: STUDENT IN AN ORGANIZED HEALTH CARE EDUCATION/TRAINING PROGRAM

## 2024-08-08 PROCEDURE — 11442 EXC FACE-MM B9+MARG 1.1-2 CM: CPT | Mod: GC | Performed by: DERMATOLOGY

## 2024-08-08 PROCEDURE — 88341 IMHCHEM/IMCYTCHM EA ADD ANTB: CPT | Mod: XS | Performed by: STUDENT IN AN ORGANIZED HEALTH CARE EDUCATION/TRAINING PROGRAM

## 2024-08-08 PROCEDURE — 88304 TISSUE EXAM BY PATHOLOGIST: CPT | Performed by: STUDENT IN AN ORGANIZED HEALTH CARE EDUCATION/TRAINING PROGRAM

## 2024-08-08 RX ORDER — DEXTROAMPHETAMINE SACCHARATE, AMPHETAMINE ASPARTATE MONOHYDRATE, DEXTROAMPHETAMINE SULFATE AND AMPHETAMINE SULFATE 2.5; 2.5; 2.5; 2.5 MG/1; MG/1; MG/1; MG/1
10 CAPSULE, EXTENDED RELEASE ORAL DAILY
Qty: 90 CAPSULE | Refills: 0 | Status: SHIPPED | OUTPATIENT
Start: 2024-08-08 | End: 2024-09-12

## 2024-08-08 ASSESSMENT — PAIN SCALES - GENERAL: PAINLEVEL: NO PAIN (0)

## 2024-08-08 NOTE — TELEPHONE ENCOUNTER
Dear Mark;    I refilled Mr. Almedia's Adderall; however, going forward, he needs to see Psychiatry to continue on this medication (I have actually never seen him personally in clinic). I placed a referral today and please give him a call and help coordinate this.    Thanks, SMOOTH Santos

## 2024-08-08 NOTE — LETTER
8/8/2024      Luiz Almeida  [address]  [place] [address]      Dear Colleague,    Thank you for referring your patient, Luiz Almeida, to the [facility]. Please see a copy of my visit note below.    DERMATOLOGY EXCISION PROCEDURE NOTE    Dermatology Problem List:  1. 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 Mccall  Fellow: Dr. Shane  Scrub Nurse: Dina Willett CMA    PRE-OPERATIVE DIAGNOSIS:  EIC vs pleomorphic adenoma  POST-OPERATIVE DIAGNOSIS: Same   LOCATION: right cheek  FINAL REPAIR LENGTH: 3.0 cm   ANESTHESIA: 8.5 mL 1% lidocaine with 1:100,000 epinephrine    INDICATIONS: This patient presented with a 2.0 x 2.0 cm subcutaneous nodule. Excision was indicated. We discussed the principles of treatment and most likely complications including scarring, bleeding, infection, incomplete excision, wound dehiscence, pain, nerve damage, and recurrence. Informed consent was obtained and the patient underwent the procedure as follows:    PROCEDURE: The patient was taken to the operative suite. Time-out was performed.  The treatment area was anesthetized with 1% lidocaine with epinephrine. The area was prepped with Chlorhexidine and rinsed with sterile saline and draped with sterile towels. The lesion was delineated and excised down to subcutaneous fat in a elliptical manner. Hemostasis was obtained by electrocoagulation.     REPAIR: An intermediate layered linear closure was selected as the procedure which would maximally preserve both function and cosmesis.    After the excision of the tumor, the area was carefully undermined. Hemostasis was obtained with bipolar  electrocoagulation.  Closure was oriented so that the wound was in the patient's natural skin tension lines. The deep subcutaneous and dermal layers were then closed with 5-0 & 4-0 monocryl sutures. The epidermis was then carefully approximated along the length of the wound using 4-0 monocryl absorbing running subcutaneous sutures     Estimated blood loss was less than 10 ml for all surgical sites. A sterile pressure dressing was applied and wound care instructions, with a written handout, were given. The patient was discharged from the [facility] alert and ambulatory.    The patient elected for pathology results to automatically release and understands that the clinical staff will contact them as soon as possible to notify them of the results.    Follow-up: prn. Regular skin checks with general dermatology.     Dr. Juan Diego Langley was immediately available for the entire surgery and was physicially present for the key portions of the procedure.    Anatomic Pathology Results: pending    Clinical Follow-Up:     Staff Involved:  Scribe/Staff    Scribe Disclosure:   I, Cordelia Carl, am serving as a scribe; to document services personally performed by Dr. Juan Diego Langley - -based on data collection and the provider's statements to me.     Staff Physician Comments:   I saw and evaluated the patient with the Mohs Surgery Fellow (Dr. Foreign Shaen) and dermatology resident (Dr. Ana Maria Mccall) I agree with the assessment and plan and the above description of the procedure. I was present for the entire procedure and entire exam.    Juan Diego Langley DO    Department of Dermatology  [facility]: Phone: [phone], Fax:[phone]  [facility]: Phone: [phone], Fax: [phone]      Again, thank you for allowing me to participate in the care of your patient.         Sincerely,        Juan Diego Langley MD

## 2024-08-08 NOTE — PATIENT INSTRUCTIONS
Caring for your skin after surgery    After your surgery, a pressure bandage will be placed over the area. This will prevent bleeding. Please follow these instructions over the next 1 to 2 weeks. Following this regimen will help to prevent complications as your wound heals.     For the first 48 hours after your surgery:    Leave the pressure dressing on and keep it dry. If it should come loose, you may re-tape it, but do not take it off.  Relax and take it easy. Do not do any vigorous exercise, heavy lifting or bending forward. This could cause the wound to bleed.  Post-operative pain is usually mild. You may alternate between 1000 mg of Tylenol (acetaminophen) and 400 mg of Ibuprofen every 4 hours.  Do not take more than 4,000 mg of acetaminophen in a 24-hour period or 3200 mg of Ibuprofen in a 24-hr period.  Avoid alcohol and vitamin E as these may increase your tendency to bleed.  You may put an ice pack around the bandaged area for 20 minutes at a time as needed. This may help reduce swelling, bruising, and pain. Make sure the ice pack is waterproof so that the pressure bandage doesn't get wet.  You may see a small amount of drainage or blood on your pressure bandage. This is normal. However:  If drainage or bleeding continues or saturates the bandage, you will need to apply firm pressure over the bandage with a clean washcloth for 15 minutes.  If bleeding continues after applying pressure for 15 minutes, apply an ice pack with gentle pressure to the bandaged area for another 15 minutes.  If bleeding still continues, call our office or go to the nearest emergency room.    48 Hours After Surgery:  Carefully remove the pressure bandage. If it seems sticky or too difficult to get off, you may need to soak it off in the shower.  Wash wound with a mild soap and water.  Use caution when washing the wound, be gentle and do not let the forceful shower stream hit the wound directly.  Pat dry.  Apply Vaseline (from a new  container or tube) over the suture line with a Q-tip.  Cover the site with a bandage.  Do this daily until the sutures have  dissolved.      What to expect:    The first couple of days your wound may be tender and may bleed slightly when doing wound care.  There may be swelling and bruising around the wound, especially if it is near the eyes. For your comfort, you may apply ice or cold compresses to the area.  The area around your wound may be numb for several weeks or even months.  You may experience periodic sharp pain or mild itching around the wound as it heals.   The suture line will look dark pink at first and the edges of the wound will be reddened. This will lighten up each day.    Call Us If:    You have bleeding that will not stop after applying pressure and ice.  You have pain that is not controlled with Tylenol and Ibuprofen.  You have signs or symptoms of an infection such as fever over 100 degrees Fahrenheit, redness, warmth or foul-smelling drainage from the wound    [facility]: [phone]   [facility]: [phone]  For urgent needs outside of business hours call the [facility] at [phone] and ask to speak with the dermatology resident on call

## 2024-08-08 NOTE — NURSING NOTE
Luiz Almeida's chief complaint for this visit includes:  Chief Complaint   Patient presents with    Excision     EIC right cheek     PCP: Taylor Hector    Referring Provider:  Miguel Vásquez MD  [address] [place] [address]    /85   Pulse 81   SpO2 99%   No Pain (0)      No Known Allergies      Do you need any medication refills at today's visit? No    Dina Willett [facility]

## 2024-08-08 NOTE — LETTER
August 8, 2024      Luiz Almeida  [address]  [place] [address]        To Whom It May Concern:    Luiz Almeida was seen in our clinic. He will be unable to shave his face for up to 2 weeks.  Once wound is healed he may resume shaving.       Sincerely,        Dina JEFFERY CMA for Dr. Juan Diego Langley, DO

## 2024-08-08 NOTE — NURSING NOTE
The following medication was given:     MEDICATION:  Lidocaine with epinephrine 1% 1:[id number]  ROUTE: SQ  SITE: see procedure note  DOSE: 8.5 mL  LOT #: [id number]  : [employer]  EXPIRATION DATE: 12/31/2025  NDC#: 01551-783-24  Was there drug waste? 0.5 mL  Multi-dose vial: Yes    Vaseline and pressure dressing applied to excision site on right cheek.  Wound care instructions reviewed with patient and AVS provided.  Patient verbalized understanding.  Patient will follow up for suture removal: N/A.  No further questions or concerns at this time.      Dina Willett CMA  August 8, 2024

## 2024-08-08 NOTE — PROGRESS NOTES
DERMATOLOGY EXCISION PROCEDURE NOTE    Dermatology Problem List:  1. 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 Mccall  Fellow: Dr. Shane  Scrub Nurse: Dina Willett CMA    PRE-OPERATIVE DIAGNOSIS:  EIC vs pleomorphic adenoma  POST-OPERATIVE DIAGNOSIS: Same   LOCATION: right cheek  FINAL REPAIR LENGTH: 3.0 cm   ANESTHESIA: 8.5 mL 1% lidocaine with 1:100,000 epinephrine    INDICATIONS: This patient presented with a 2.0 x 2.0 cm subcutaneous nodule. Excision was indicated. We discussed the principles of treatment and most likely complications including scarring, bleeding, infection, incomplete excision, wound dehiscence, pain, nerve damage, and recurrence. Informed consent was obtained and the patient underwent the procedure as follows:    PROCEDURE: The patient was taken to the operative suite. Time-out was performed.  The treatment area was anesthetized with 1% lidocaine with epinephrine. The area was prepped with Chlorhexidine and rinsed with sterile saline and draped with sterile towels. The lesion was delineated and excised down to subcutaneous fat in a elliptical manner. Hemostasis was obtained by electrocoagulation.     REPAIR: An intermediate layered linear closure was selected as the procedure which would maximally preserve both function and cosmesis.    After the excision of the tumor, the area was carefully undermined. Hemostasis was obtained with bipolar electrocoagulation.  Closure was oriented so that the wound was in the patient's natural skin tension lines. The deep subcutaneous and dermal layers were then closed with 5-0 & 4-0 monocryl sutures. The epidermis was then carefully approximated  along the length of the wound using 4-0 monocryl absorbing running subcutaneous sutures     Estimated blood loss was less than 10 ml for all surgical sites. A sterile pressure dressing was applied and wound care instructions, with a written handout, were given. The patient was discharged from the [facility] Surgery Center alert and ambulatory.    The patient elected for pathology results to automatically release and understands that the clinical staff will contact them as soon as possible to notify them of the results.    Follow-up: prn. Regular skin checks with general dermatology.     Dr. Juan Diego Langley was immediately available for the entire surgery and was physicially present for the key portions of the procedure.    Anatomic Pathology Results: pending    Clinical Follow-Up:     Staff Involved:  Scribe/Staff    Scribe Disclosure:   I, Cordelia Carl, am serving as a scribe; to document services personally performed by Dr. JuanD iego Langley - -based on data collection and the provider's statements to me.     Staff Physician Comments:   I saw and evaluated the patient with the Mohs Surgery Fellow (Dr. Foreign Shane) and dermatology resident (Dr. Ana Maria Mccall) I agree with the assessment and plan and the above description of the procedure. I was present for the entire procedure and entire exam.    Juan Diego Langley DO    Department of Dermatology  [facility]: Phone: [phone], Fax:[phone]  [facility]: Phone: [phone], Fax: [phone]

## 2024-08-14 LAB
PATH REPORT.COMMENTS IMP SPEC: NORMAL
PATH REPORT.FINAL DX SPEC: NORMAL
PATH REPORT.GROSS SPEC: NORMAL
PATH REPORT.MICROSCOPIC SPEC OTHER STN: NORMAL
PATH REPORT.RELEVANT HX SPEC: NORMAL

## 2024-08-16 ENCOUNTER — TELEPHONE (OUTPATIENT)
Dept: DERMATOLOGY | Facility: CLINIC | Age: 24
End: 2024-08-16
Payer: COMMERCIAL

## 2024-08-16 ENCOUNTER — TELEPHONE (OUTPATIENT)
Dept: OTOLARYNGOLOGY | Facility: CLINIC | Age: 24
End: 2024-08-16
Payer: COMMERCIAL

## 2024-08-16 NOTE — TELEPHONE ENCOUNTER
Left Voicemail (1st Attempt) for the patient to call back and schedule the following:    Appointment type: New ENT   Provider: Head and Neck  Return date:Next available opening   Specialty phone number: [phone]  Additional appointment(s) needed:   Additional Notes: DX: Pleomorphic adenoma of parotid gland

## 2024-09-08 ENCOUNTER — MYC REFILL (OUTPATIENT)
Dept: INTERNAL MEDICINE | Facility: CLINIC | Age: 24
End: 2024-09-08
Payer: COMMERCIAL

## 2024-09-08 DIAGNOSIS — F90.2 ATTENTION DEFICIT HYPERACTIVITY DISORDER (ADHD), COMBINED TYPE: ICD-10-CM

## 2024-09-12 ENCOUNTER — MYC REFILL (OUTPATIENT)
Dept: INTERNAL MEDICINE | Facility: CLINIC | Age: 24
End: 2024-09-12
Payer: COMMERCIAL

## 2024-09-12 DIAGNOSIS — F90.2 ATTENTION DEFICIT HYPERACTIVITY DISORDER (ADHD), COMBINED TYPE: ICD-10-CM

## 2024-09-13 RX ORDER — DEXTROAMPHETAMINE SACCHARATE, AMPHETAMINE ASPARTATE MONOHYDRATE, DEXTROAMPHETAMINE SULFATE AND AMPHETAMINE SULFATE 2.5; 2.5; 2.5; 2.5 MG/1; MG/1; MG/1; MG/1
10 CAPSULE, EXTENDED RELEASE ORAL DAILY
Qty: 90 CAPSULE | Refills: 0 | Status: SHIPPED | OUTPATIENT
Start: 2024-09-13 | End: 2024-10-01

## 2024-09-20 NOTE — TELEPHONE ENCOUNTER
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 Pleomorphic adenoma R cheek/parotid, s/p excision x2. Please evaluate and treat for any additional surgical recommendations. Ref by Juan Diego Langley MD. [facility] verified     RECORDS REQUESTED FROM      Clinic name Comments Records Status Imaging Status   [facility]  8/8/24 referral/ OV- Juan Diego Langley MD  [facility]  Anatomic Pathology Lab  Ph: [phone]  Fax: [phone] 8/8/2024 Dermatological Path Order and Indications: [id number]  [facility]  1/9/2018 EXCISION CYST--sebaceous cyst right cheek with Gregorio Metzger MD   [facility]  Path # [id number] opt 3  Fax [phone] 1/9/2018 Case: [id number]  CE

## 2024-10-01 ENCOUNTER — MYC MEDICAL ADVICE (OUTPATIENT)
Dept: UROLOGY | Facility: CLINIC | Age: 24
End: 2024-10-01

## 2024-10-01 ENCOUNTER — VIRTUAL VISIT (OUTPATIENT)
Dept: INTERNAL MEDICINE | Facility: CLINIC | Age: 24
End: 2024-10-01
Payer: COMMERCIAL

## 2024-10-01 DIAGNOSIS — F90.2 ATTENTION DEFICIT HYPERACTIVITY DISORDER (ADHD), COMBINED TYPE: ICD-10-CM

## 2024-10-01 PROCEDURE — 99213 OFFICE O/P EST LOW 20 MIN: CPT | Performed by: INTERNAL MEDICINE

## 2024-10-01 RX ORDER — LISDEXAMFETAMINE DIMESYLATE 30 MG/1
30 CAPSULE ORAL EVERY MORNING
Qty: 30 CAPSULE | Refills: 0 | Status: SHIPPED | OUTPATIENT
Start: 2024-10-01

## 2024-10-01 RX ORDER — DEXTROAMPHETAMINE SACCHARATE, AMPHETAMINE ASPARTATE MONOHYDRATE, DEXTROAMPHETAMINE SULFATE AND AMPHETAMINE SULFATE 2.5; 2.5; 2.5; 2.5 MG/1; MG/1; MG/1; MG/1
10 CAPSULE, EXTENDED RELEASE ORAL DAILY
Qty: 90 CAPSULE | Refills: 0 | Status: CANCELLED | OUTPATIENT
Start: 2024-10-01

## 2024-10-01 NOTE — PATIENT INSTRUCTIONS
Thank you for visiting the Primary Care Center today at the [facility]! The following is some information about our clinic:     Primary Care Center Frequently-Asked Questions    (1) How do I schedule appointments at the [facility]?     Primary Care--to schedule or make changes to an existing appointment, please call our primary care line at [phone].    Labs--to schedule a lab appointment at the [facility] you can use [employer] or call [phone]. If you have a [place] location that is closer to home, you can reach out to that location for scheduling options.     Imaging--if you need to schedule a CT, X-ray, MRI, ultrasound, or other imaging study you can call [phone] to schedule at the [facility] or any other [facility] imaging location.     Referrals--if a referral to another specialty was ordered you can expect a phone call from their scheduling team. If you have not heard from them in a week, please call us or send us a Sunshine message to check the status or get a scheduling number. Please note that this only applies to internal [facility] referrals. If the referral is external you would need to contact their office for scheduling.     (2) I have a question about my visit, who do I contact?     You can call us at the primary care line at [phone] to ask questions about your visit. You can also send a secure message through Sunshine, which is reviewed by clinic staff. Please note that [employer] messages have a twenty-four to forty-eight business hour turnaround time and should not be used for urgent concerns.    (3) How will I get the results of my tests?    If you are signed up for [employer] all tests will be released to you within twenty-four hours of resulting. Please allow three to five days for your doctor to review your results and place a note interpreting the results. If you do not have emazehart you will receive your  results through mail seven to ten business days following the return of the tests. Please note that if there should be any urgent or concerning results that your doctor or their registered nurse will reach out to you the same day as the tests come back. If you have follow up questions about your results or would like to discuss the results in detail please schedule a follow up with your provider either in person or virtually.     (4) How do I get refills of my prescriptions?     You should always first contact your pharmacy for refills of your medications. If submitting a refill request on [employer], please be sure to submit the request only once--repeat requests can cause delays in refill. If you are requesting a NEW medication or a medication related to new symptoms you will need to schedule an appointment with a provider prior to approval. Please note: Routine medication refills have up to one to three business day turnaround whereas controlled substances refills have up to five to seven business day turnaround.    (5) I have new symptoms, what do I do?     If you are having an immediate medical emergency, you should dial 911 for assistance.   For anything urgent that needs to be seen within a few hours to one day you should visit a local urgent care for assistance.  For non-urgent symptoms that need to be seen within a few days to a week you can schedule with an available provider in primary care by going to [employer] or calling [phone].   If you are not sure how serious your symptoms are or you would like to receive medical advice you can always call [phone] to speak with a triage nurse.     none

## 2024-10-01 NOTE — TELEPHONE ENCOUNTER
Left Voicemail (1st Attempt) and Sent Mychart (1st Attempt) for the patient to call back and schedule the following:    Appointment type: P Return   Provider: Dr. Ro  Return date: ~October 28th   Specialty phone number: [phone]  Additonal Notes: Per check out - Return in about 27 days (around 10/28/2024) for phone f/up visit for med mgmt.

## 2024-10-01 NOTE — PROGRESS NOTES
Luiz is a 24 year old who is being evaluated via a billable telephone visit.    What phone number would you like to be contacted at? [phone]  How would you like to obtain your AVS? Gloriahart  Originating Location (pt. Location): Home    Distant Location (provider location):  On-site    Assessment & Plan     Attention deficit hyperactivity disorder (ADHD), combined type  Discussed risks and benefits of stimulants and concerns about going up on the adderall dosing given the borderline BP readings. He notes that the adderall works well during the day and he's just wanting to see if the 15 mg XR would give him longer action into the evening. In general, the half life is similar but with a higher peak and therefore more significant stimulant effects. Thus we discussed a trial of lisdexamfetamine which can provide 10-14 hrs of effect at an equivalent dose. Will follow weight, BP, and therapeutic effect. Rx sent to our pharmacy.  - lisdexamfetamine (VYVANSE) 30 MG capsule; Take 1 capsule (30 mg) by mouth every morning.    Return in about 27 days (around 10/28/2024) for phone f/up visit for med [facility].    Subjective   Luiz is a 24 year old, presenting for the following health issues:  RECHECK and Recheck Medication      Objective    Vitals - Patient Reported  Pain Score: No Pain (0)    Are refills needed on medications prescribed by this physician? YES     Vitals:  No vitals were obtained today due to virtual visit.    Physical Exam   General: Alert and no distress //Respiratory: No audible wheeze, cough, or shortness of breath // Psychiatric:  Appropriate affect, tone, and pace of words  Phone call duration: 20 minutes  Signed Electronically by: Ming Ro MD

## 2024-10-10 ENCOUNTER — ALLIED HEALTH/NURSE VISIT (OUTPATIENT)
Dept: FAMILY MEDICINE | Facility: CLINIC | Age: 24
End: 2024-10-10
Payer: COMMERCIAL

## 2024-10-10 DIAGNOSIS — Z23 ENCOUNTER FOR IMMUNIZATION: Primary | ICD-10-CM

## 2024-10-10 PROCEDURE — 99207 PR NO CHARGE NURSE ONLY: CPT

## 2024-10-10 PROCEDURE — 90656 IIV3 VACC NO PRSV 0.5 ML IM: CPT

## 2024-10-10 PROCEDURE — 90715 TDAP VACCINE 7 YRS/> IM: CPT

## 2024-10-10 PROCEDURE — 90472 IMMUNIZATION ADMIN EACH ADD: CPT

## 2024-10-10 PROCEDURE — 90471 IMMUNIZATION ADMIN: CPT

## 2024-10-10 NOTE — PROGRESS NOTES
Prior to immunization administration, verified patients identity using patient s name and date of birth. Please see Immunization Activity for additional information.     Screening Questionnaire for Adult Immunization    Are you sick today?   No   Do you have allergies to medications, food, a vaccine component or latex?   No   Have you ever had a serious reaction after receiving a vaccination?   No   Do you have a long-term health problem with heart, lung, kidney, or metabolic disease (e.g., diabetes), asthma, a blood disorder, no spleen, complement component deficiency, a cochlear implant, or a spinal fluid leak?  Are you on long-term aspirin therapy?   No   Do you have cancer, leukemia, HIV/AIDS, or any other immune system problem?   No   Do you have a parent, brother, or sister with an immune system problem?   No   In the past 3 months, have you taken medications that affect  your immune system, such as prednisone, other steroids, or anticancer drugs; drugs for the treatment of rheumatoid arthritis, Crohn s disease, or psoriasis; or have you had radiation treatments?   No   Have you had a seizure, or a brain or other nervous system problem?   No   During the past year, have you received a transfusion of blood or blood    products, or been given immune (gamma) globulin or antiviral drug?   No   For women: Are you pregnant or is there a chance you could become       pregnant during the next month?   No   Have you received any vaccinations in the past 4 weeks?   No     Immunization questionnaire answers were all negative.    I have reviewed the following standing orders:   This patient is due and qualifies for the Influenza vaccine.    Click here for Influenza Vaccine Standing Order    I have reviewed the vaccines inclusion and exclusion criteria; No concerns regarding eligibility.         This patient is due and qualifies for a TDAP vaccine.    Click here for Tdap Standing Order    I have reviewed the vaccines  inclusion and exclusion criteria; No concerns regarding eligibility.     Patient instructed to remain in clinic for 15 minutes afterwards, and to report any adverse reactions.     Screening performed by Jonnie Crowley CMA on 10/10/2024 at 9:12 AM.      Prior to immunization administration, verified patients identity using patient s name and date of birth. Please see Immunization Activity for additional information.     Is the patient's temperature normal (100.5 or less)? Yes     Patient MEETS CRITERIA. PROCEED with vaccine administration.      Patient instructed to remain in clinic for 15 minutes afterwards, and to report any adverse reactions.      Link to Ancillary Visit Immunization Standing Orders SmartSet     Screening performed by Jonnie Crowley CMA on 10/10/2024 at 9:13 AM.

## 2024-10-11 ENCOUNTER — PRE VISIT (OUTPATIENT)
Dept: OTOLARYNGOLOGY | Facility: CLINIC | Age: 24
End: 2024-10-11

## 2024-10-11 ENCOUNTER — OFFICE VISIT (OUTPATIENT)
Dept: OTOLARYNGOLOGY | Facility: CLINIC | Age: 24
End: 2024-10-11
Attending: DERMATOLOGY
Payer: COMMERCIAL

## 2024-10-11 VITALS
OXYGEN SATURATION: 99 % | HEIGHT: 74 IN | WEIGHT: 181 LBS | HEART RATE: 86 BPM | DIASTOLIC BLOOD PRESSURE: 86 MMHG | BODY MASS INDEX: 23.23 KG/M2 | SYSTOLIC BLOOD PRESSURE: 130 MMHG

## 2024-10-11 DIAGNOSIS — D11.0 PLEOMORPHIC ADENOMA OF PAROTID GLAND: Primary | ICD-10-CM

## 2024-10-11 PROCEDURE — 99204 OFFICE O/P NEW MOD 45 MIN: CPT | Performed by: STUDENT IN AN ORGANIZED HEALTH CARE EDUCATION/TRAINING PROGRAM

## 2024-10-11 ASSESSMENT — PAIN SCALES - GENERAL: PAINLEVEL: NO PAIN (0)

## 2024-10-11 NOTE — PROGRESS NOTES
Otolaryngology New Patient Note  October 11, 2024    CC: Recurrent pleomorphic adenoma     HPI: Luiz Almeida is a 24 year old male  who presents with a diagnosis of recurrent pleomorphic adenoma. He noted a mass on his cheek in high school, it was removed and he was told this was a sebaceous cyst. He had recurrence of the presumed cyst and was referred to the dermatology team who performed an excision of multiple lesions which resulted as pleomorphic adenoma. I suspect the first lesion was pleomorphic adenoma. He has mild facial asymmetry after the most recent procedure but otherwise has recovered. He's feeling well. No pain.     PMH: ADHD  Surg: prior right cheek excision in 2018, recent derm excision   Social: works as a , no smoking    Current Outpatient Medications   Medication Sig Dispense Refill    lisdexamfetamine (VYVANSE) 30 MG capsule Take 1 capsule (30 mg) by mouth every morning. 30 capsule 0     No Known Allergies    Social History     Socioeconomic History    Marital status:      Spouse name: Not on file    Number of children: Not on file    Years of education: Not on file    Highest education level: Not on file   Occupational History    Not on file   Tobacco Use    Smoking status: Never    Smokeless tobacco: Never   Vaping Use    Vaping status: Never Used   Substance and Sexual Activity    Alcohol use: Not on file    Drug use: Not on file    Sexual activity: Not on file   Other Topics Concern    Not on file   Social History Narrative    Not on file     Social Determinants of Health     Financial Resource Strain: Low Risk  (10/1/2024)    Financial Resource Strain     Within the past 12 months, have you or your family members you live with been unable to get utilities (heat, electricity) when it was really needed?: No   Food Insecurity: Low Risk  (10/1/2024)    Food Insecurity     Within the past 12 months, did you worry that your food would run out before you got money to buy  "more?: No     Within the past 12 months, did the food you bought just not last and you didn t have money to get more?: No   Transportation Needs: Low Risk  (10/1/2024)    Transportation Needs     Within the past 12 months, has lack of transportation kept you from medical appointments, getting your medicines, non-medical meetings or appointments, work, or from getting things that you need?: No   Physical Activity: Not on file   Stress: Not on file   Social Connections: Unknown (1/1/2022)    Received from [employer] [facility]    Social Connections     Frequency of Communication with Friends and Family: Not on file   Interpersonal Safety: Not on file   Housing Stability: Low Risk  (10/1/2024)    Housing Stability     Do you have housing? : Yes     Are you worried about losing your housing?: No     FH: reviewed and insignificant     ROS: 12 point review of systems is negative unless noted in HPI.    PHYSICAL EXAM:  General: no acute distress  /86 (BP Location: Right arm, Patient Position: Sitting, Cuff Size: Adult Regular)   Pulse 86   Ht 1.88 m (6' 2\")   Wt 82.1 kg (181 lb)   SpO2 99%   BMI 23.24 kg/m    HEAD: normocephalic, atraumatic  Face: symmetrical, CN VII intact bilaterally (HB 1), no swelling, edema, or erythema. Sensation V1-V3 intact and equal bilaterally. Vertical scar 2.5 cm to the central right cheek. I cannot palpate any residual lesion in the right parotid gland.   Eyes: EOMI without spontaneous or gaze evoked nystagmus, PERRL, clear sclera  Ears: no tragal tenderness, external ear canal open and clear bilaterally, TMs clear bilaterally  Nose: no anterior drainage, intact and midline septum without perforation or hematoma   Mouth: moist, no ulcers, no jaw or tooth tenderness, tongue midline and symmetric  Oropharynx: tonsils within normal limits, uvula midline, no oropharyngeal erythema  Neck: no LAD, trachea midline  Neuro: cranial nerves 2-12 grossly " intact    Assessment and Plan  Luiz Almeida is a 24 year old male with a recurrent pleomorphic adenoma of the right cheek. We discussed an MRI and follow-up based on the results of the scan. If there is residual disease, likely tumor board and consideration of superficial parotidectomy. If the area is free, then observation and repeat scan at an interval of 6-12 months.     Sarbjit Rodriges MD   Otolaryngology-Head & Neck Surgery  Please page ENT with questions by dialing [phone] and entering job code [phone] when prompted.

## 2024-10-11 NOTE — LETTER
10/11/2024       RE: Luiz Almeida  [address]  [place] [address]     Dear Colleague,    Thank you for referring your patient, Luiz Almeida, to the [facility] [place] at [facility]. Please see a copy of my visit note below.    Otolaryngology New Patient Note  October 11, 2024    CC: Recurrent pleomorphic adenoma     HPI: Luiz Almeida is a 24 year old male  who presents with a diagnosis of recurrent pleomorphic adenoma. He noted a mass on his cheek in high school, it was removed and he was told this was a sebaceous cyst. He had recurrence of the presumed cyst and was referred to the dermatology team who performed an excision of multiple lesions which resulted as pleomorphic adenoma. I suspect the first lesion was pleomorphic adenoma. He has mild facial asymmetry after the most recent procedure but otherwise has recovered. He's feeling well. No pain.     PMH: ADHD  Surg: prior right cheek excision in 2018, recent derm excision   Social: works as a , no smoking    Current Outpatient Medications   Medication Sig Dispense Refill     lisdexamfetamine (VYVANSE) 30 MG capsule Take 1 capsule (30 mg) by mouth every morning. 30 capsule 0     No Known Allergies    Social History     Socioeconomic History     Marital status:      Spouse name: Not on file     Number of children: Not on file     Years of education: Not on file     Highest education level: Not on file   Occupational History     Not on file   Tobacco Use     Smoking status: Never     Smokeless tobacco: Never   Vaping Use     Vaping status: Never Used   Substance and Sexual Activity     Alcohol use: Not on file     Drug use: Not on file     Sexual activity: Not on file   Other Topics Concern     Not on file   Social History Narrative     Not on file     Social Determinants of Health     Financial Resource Strain: Low Risk  (10/1/2024)     "Financial Resource Strain      Within the past 12 months, have you or your family members you live with been unable to get utilities (heat, electricity) when it was really needed?: No   Food Insecurity: Low Risk  (10/1/2024)    Food Insecurity      Within the past 12 months, did you worry that your food would run out before you got money to buy more?: No      Within the past 12 months, did the food you bought just not last and you didn t have money to get more?: No   Transportation Needs: Low Risk  (10/1/2024)    Transportation Needs      Within the past 12 months, has lack of transportation kept you from medical appointments, getting your medicines, non-medical meetings or appointments, work, or from getting things that you need?: No   Physical Activity: Not on file   Stress: Not on file   Social Connections: Unknown (1/1/2022)    Received from [employer] [facility]    Social Connections      Frequency of Communication with Friends and Family: Not on file   Interpersonal Safety: Not on file   Housing Stability: Low Risk  (10/1/2024)    Housing Stability      Do you have housing? : Yes      Are you worried about losing your housing?: No     FH: reviewed and insignificant     ROS: 12 point review of systems is negative unless noted in HPI.    PHYSICAL EXAM:  General: no acute distress  /86 (BP Location: Right arm, Patient Position: Sitting, Cuff Size: Adult Regular)   Pulse 86   Ht 1.88 m (6' 2\")   Wt 82.1 kg (181 lb)   SpO2 99%   BMI 23.24 kg/m    HEAD: normocephalic, atraumatic  Face: symmetrical, CN VII intact bilaterally (HB 1), no swelling, edema, or erythema. Sensation V1-V3 intact and equal bilaterally. Vertical scar 2.5 cm to the central right cheek. I cannot palpate any residual lesion in the right parotid gland.   Eyes: EOMI without spontaneous or gaze evoked nystagmus, PERRL, clear sclera  Ears: no tragal tenderness, external ear canal open and clear bilaterally, TMs clear " bilaterally  Nose: no anterior drainage, intact and midline septum without perforation or hematoma   Mouth: moist, no ulcers, no jaw or tooth tenderness, tongue midline and symmetric  Oropharynx: tonsils within normal limits, uvula midline, no oropharyngeal erythema  Neck: no LAD, trachea midline  Neuro: cranial nerves 2-12 grossly intact    Assessment and Plan  Luiz Almeida is a 24 year old male with a recurrent pleomorphic adenoma of the right cheek. We discussed an MRI and follow-up based on the results of the scan. If there is residual disease, likely tumor board and consideration of superficial parotidectomy. If the area is free, then observation and repeat scan at an interval of 6-12 months.     Sarbjit Rodriges MD   Otolaryngology-Head & Neck Surgery  Please page ENT with questions by dialing [phone] and entering job code [phone] when prompted.    Again, thank you for allowing me to participate in the care of your patient.      Sincerely,    Sarbjit Rodriges MD

## 2024-10-11 NOTE — NURSING NOTE
"Chief Complaint   Patient presents with    Consult   Blood pressure 130/86, pulse 86, height 1.88 m (6' 2\"), weight 82.1 kg (181 lb), SpO2 99%. Raymundo Santo, EMT    "

## 2024-11-01 ENCOUNTER — MYC MEDICAL ADVICE (OUTPATIENT)
Dept: INTERNAL MEDICINE | Facility: CLINIC | Age: 24
End: 2024-11-01
Payer: COMMERCIAL

## 2024-11-01 ENCOUNTER — MYC REFILL (OUTPATIENT)
Dept: INTERNAL MEDICINE | Facility: CLINIC | Age: 24
End: 2024-11-01
Payer: COMMERCIAL

## 2024-11-01 DIAGNOSIS — F90.2 ATTENTION DEFICIT HYPERACTIVITY DISORDER (ADHD), COMBINED TYPE: ICD-10-CM

## 2024-11-04 RX ORDER — LISDEXAMFETAMINE DIMESYLATE 30 MG/1
30 CAPSULE ORAL EVERY MORNING
Qty: 30 CAPSULE | Refills: 0 | Status: SHIPPED | OUTPATIENT
Start: 2024-11-04

## 2024-11-12 ENCOUNTER — ANCILLARY PROCEDURE (OUTPATIENT)
Dept: MRI IMAGING | Facility: CLINIC | Age: 24
End: 2024-11-12
Attending: STUDENT IN AN ORGANIZED HEALTH CARE EDUCATION/TRAINING PROGRAM
Payer: COMMERCIAL

## 2024-11-12 DIAGNOSIS — D11.0 PLEOMORPHIC ADENOMA OF PAROTID GLAND: ICD-10-CM

## 2024-11-12 PROCEDURE — A9585 GADOBUTROL INJECTION: HCPCS | Performed by: RADIOLOGY

## 2024-11-12 PROCEDURE — 70543 MRI ORBT/FAC/NCK W/O &W/DYE: CPT | Performed by: RADIOLOGY

## 2024-11-12 RX ORDER — GADOBUTROL 604.72 MG/ML
10 INJECTION INTRAVENOUS ONCE
Status: COMPLETED | OUTPATIENT
Start: 2024-11-12 | End: 2024-11-12

## 2024-11-12 RX ADMIN — GADOBUTROL 8 ML: 604.72 INJECTION INTRAVENOUS at 18:36

## 2024-11-13 ENCOUNTER — TELEPHONE (OUTPATIENT)
Dept: OTOLARYNGOLOGY | Facility: CLINIC | Age: 24
End: 2024-11-13
Payer: COMMERCIAL

## 2024-11-13 DIAGNOSIS — D11.0 PLEOMORPHIC ADENOMA OF PAROTID GLAND: Primary | ICD-10-CM

## 2024-11-13 NOTE — TELEPHONE ENCOUNTER
Writer called and spoek to pt about MRI results. Writer stated that Dr. Rodriges has reviewed the MR and had no concerns, recommends repeat scan in 8 months or sooner if bump grows. Pt expressed understanding.    Tamika Ortiz RN

## 2024-11-25 ENCOUNTER — VIRTUAL VISIT (OUTPATIENT)
Dept: INTERNAL MEDICINE | Facility: CLINIC | Age: 24
End: 2024-11-25
Payer: COMMERCIAL

## 2024-11-25 DIAGNOSIS — F90.2 ATTENTION DEFICIT HYPERACTIVITY DISORDER (ADHD), COMBINED TYPE: ICD-10-CM

## 2024-11-25 PROCEDURE — 99213 OFFICE O/P EST LOW 20 MIN: CPT | Mod: 95 | Performed by: INTERNAL MEDICINE

## 2024-11-25 RX ORDER — LISDEXAMFETAMINE DIMESYLATE 30 MG/1
30 CAPSULE ORAL EVERY MORNING
Qty: 90 CAPSULE | Refills: 0 | Status: SHIPPED | OUTPATIENT
Start: 2024-11-25

## 2024-11-25 NOTE — PROGRESS NOTES
"Luiz is a 24 year old who is being evaluated via a billable video visit.    How would you like to obtain your AVS? [employer]  If the video visit is dropped, the invitation should be resent by: Text to cell phone: [phone]  Will anyone else be joining your video visit? No      Are refills needed on medications prescribed by this physician? NO    Assessment & Plan     Attention deficit hyperactivity disorder (ADHD), combined type  Luiz is doing well after the transition from Adderall to Vyvanse. Longer duration of action and less side effects. No home BP readings taken. Plan for 90 refill and he'll send home readings by [employer]. If elevated will order 24 hr ambulatory monitor given his young age and goal of avoiding medications if possible.  - lisdexamfetamine (VYVANSE) 30 MG capsule; Take 1 capsule (30 mg) by mouth every morning.    No follow-ups on file.    Subjective   Luiz is a 24 year old, presenting for the following health issues:  RECHECK and Recheck Medication    History of Present Illness       Reason for visit:  Follow up to new medication    He eats 4 or more servings of fruits and vegetables daily.He consumes 0 sweetened beverage(s) daily.He exercises with enough effort to increase his heart rate 30 to 60 minutes per day.  He exercises with enough effort to increase his heart rate 4 days per week.   He is taking medications regularly.             Review of Systems  Constitutional, HEENT, cardiovascular, pulmonary, gi and gu systems are negative, except as otherwise noted.      Objective    Vitals - Patient Reported  Systolic (Patient Reported):  (Not today)  Weight (Patient Reported): 79.4 kg (175 lb)  Height (Patient Reported): 186.7 cm (6' 1.5\")  BMI (Based on Pt Reported Ht/Wt): 22.78  Pain Score: No Pain (0)    Vitals:  No vitals were obtained today due to virtual visit.    Physical Exam   PSYCH: Appropriate affect, tone, and pace of words        Video-Visit Details    Type of service:  Video " Visit   Originating Location (pt. Location): Home    Distant Location (provider location):  On-site  Platform used for Video Visit: Telephone  Signed Electronically by: Ming Ro MD

## 2024-11-25 NOTE — PATIENT INSTRUCTIONS
Thank you for visiting the Primary Care Center today at the [facility]! The following is some information about our clinic:     Primary Care Center Frequently-Asked Questions    (1) How do I schedule appointments at the [facility]?     Primary Care--to schedule or make changes to an existing appointment, please call our primary care line at [phone].    Labs--to schedule a lab appointment at the [facility] you can use Snappy shuttle or call [phone]. If you have a [place] location that is closer to home, you can reach out to that location for scheduling options.     Imaging--if you need to schedule a CT, X-ray, MRI, ultrasound, or other imaging study you can call [phone] to schedule at the [facility] or any other [facility] imaging location.     Referrals--if a referral to another specialty was ordered you can expect a phone call from their scheduling team. If you have not heard from them in a week, please call us or send us a Snappy shuttle message to check the status or get a scheduling number. Please note that this only applies to internal [facility] referrals. If the referral is external you would need to contact their office for scheduling.     (2) I have a question about my visit, who do I contact?     You can call us at the primary care line at [phone] to ask questions about your visit. You can also send a secure message through Snappy shuttle, which is reviewed by clinic staff. Please note that Snappy shuttle messages have a twenty-four to forty-eight business hour turnaround time and should not be used for urgent concerns.    (3) How will I get the results of my tests?    If you are signed up for [employer] all tests will be released to you within twenty-four hours of resulting. Please allow three to five days for your doctor to review your results and place a note interpreting the results. If you do not have [employer] you will receive your  results through mail seven to ten business days following the return of the tests. Please note that if there should be any urgent or concerning results that your doctor or their registered nurse will reach out to you the same day as the tests come back. If you have follow up questions about your results or would like to discuss the results in detail please schedule a follow up with your provider either in person or virtually.     (4) How do I get refills of my prescriptions?     You should always first contact your pharmacy for refills of your medications. If submitting a refill request on [employer], please be sure to submit the request only once--repeat requests can cause delays in refill. If you are requesting a NEW medication or a medication related to new symptoms you will need to schedule an appointment with a provider prior to approval. Please note: Routine medication refills have up to one to three business day turnaround whereas controlled substances refills have up to five to seven business day turnaround.    (5) I have new symptoms, what do I do?     If you are having an immediate medical emergency, you should dial 911 for assistance.   For anything urgent that needs to be seen within a few hours to one day you should visit a local urgent care for assistance.  For non-urgent symptoms that need to be seen within a few days to a week you can schedule with an available provider in primary care by going to [employer] or calling [phone].   If you are not sure how serious your symptoms are or you would like to receive medical advice you can always call [phone] to speak with a triage nurse.

## 2025-03-06 ENCOUNTER — MYC REFILL (OUTPATIENT)
Dept: INTERNAL MEDICINE | Facility: CLINIC | Age: 25
End: 2025-03-06
Payer: COMMERCIAL

## 2025-03-06 DIAGNOSIS — F90.2 ATTENTION DEFICIT HYPERACTIVITY DISORDER (ADHD), COMBINED TYPE: ICD-10-CM

## 2025-03-06 NOTE — TELEPHONE ENCOUNTER
Controlled substance refill request notes    Refill request received for: Lisdexamfetamine 30 Mg Capsule  [place]  data reviewed 03/06/25:  Medication last refill: 90 tab, 90 day supply, filled/sold to patient on 12/07/2024  Pended order: Lisdexamfetamine 30 Mg Capsule, 90 tab, 90 day supply, fill on or after 03/07/2025     Primary care provider: Taylor Hector  Last office visit this department: 5/21/2024 - Dr. Molina / Dr. Ro  Last virtual visit this department: 11/25/2024 - Dr. Ro  Next appointment with PCP: None    Refill request forwarded to provider for review.     Aleisha NAJERA LPN  [facility]

## 2025-03-10 RX ORDER — LISDEXAMFETAMINE DIMESYLATE 30 MG/1
30 CAPSULE ORAL EVERY MORNING
Qty: 90 CAPSULE | Refills: 0 | Status: SHIPPED | OUTPATIENT
Start: 2025-03-10

## 2025-03-16 ENCOUNTER — HEALTH MAINTENANCE LETTER (OUTPATIENT)
Age: 25
End: 2025-03-16

## 2025-06-05 ENCOUNTER — MYC REFILL (OUTPATIENT)
Dept: INTERNAL MEDICINE | Facility: CLINIC | Age: 25
End: 2025-06-05
Payer: COMMERCIAL

## 2025-06-05 DIAGNOSIS — F90.2 ATTENTION DEFICIT HYPERACTIVITY DISORDER (ADHD), COMBINED TYPE: ICD-10-CM

## 2025-06-05 NOTE — TELEPHONE ENCOUNTER
Controlled substance refill request notes    Refill request received for: Lisdexamfetamine 30 Mg Capsule  [place]  data reviewed 06/05/25:  Medication last refill: qty 90, 90 day supply, filled/sold to patient on 03/11/2025  Pended order: Lisdexamfetamine 30 Mg Capsule, qty 90, 90 day supply, fill on or after 06/09/2025     Primary care provider: Taylor Hector  Last office visit with this department: 5/21/2024  Last virtual visit with this department: 11/25/2024  Next appointment with PCP: None    Refill request forwarded to provider for review.     Aleisha NAJERA LPN  [facility]

## 2025-06-09 RX ORDER — LISDEXAMFETAMINE DIMESYLATE 30 MG/1
30 CAPSULE ORAL EVERY MORNING
Qty: 90 CAPSULE | Refills: 0 | Status: SHIPPED | OUTPATIENT
Start: 2025-06-09